# Patient Record
Sex: FEMALE | Race: WHITE | NOT HISPANIC OR LATINO | URBAN - NONMETROPOLITAN AREA
[De-identification: names, ages, dates, MRNs, and addresses within clinical notes are randomized per-mention and may not be internally consistent; named-entity substitution may affect disease eponyms.]

---
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
## 2025-01-27 ENCOUNTER — OFFICE VISIT (OUTPATIENT)
Dept: URBAN - NONMETROPOLITAN AREA SURGERY CENTER 1 | Facility: SURGERY CENTER | Age: 20
End: 2025-01-27
Payer: COMMERCIAL

## 2025-01-27 DIAGNOSIS — K21.00 CHRONIC REFLUX ESOPHAGITIS: ICD-10-CM

## 2025-01-27 DIAGNOSIS — K22.89 OTHER SPECIFIED DISEASE OF ESOPHAGUS: ICD-10-CM

## 2025-01-27 DIAGNOSIS — K21.00 ESOPHAGITIS, REFLUX: ICD-10-CM

## 2025-01-27 PROCEDURE — 43239 EGD BIOPSY SINGLE/MULTIPLE: CPT | Performed by: INTERNAL MEDICINE

## 2025-01-27 PROCEDURE — 00731 ANES UPR GI NDSC PX NOS: CPT | Performed by: NURSE ANESTHETIST, CERTIFIED REGISTERED

## 2025-01-27 RX ORDER — OMEPRAZOLE 40 MG/1
1 CAPSULE 30 MINUTES BEFORE MORNING MEAL CAPSULE, DELAYED RELEASE ORAL ONCE A DAY
Qty: 90 | Refills: 3 | Status: ACTIVE | COMMUNITY
Start: 2024-10-22

## 2025-01-27 RX ORDER — RIZATRIPTAN 10 MG/1
TABLET, FILM COATED ORAL
Qty: 9 TABLET | Status: ACTIVE | COMMUNITY

## 2025-01-27 RX ORDER — VALACYCLOVIR 1 G/1
TABLET, FILM COATED ORAL
Qty: 30 TABLET | Status: ACTIVE | COMMUNITY

## 2025-01-27 RX ORDER — DICYCLOMINE HYDROCHLORIDE 10 MG/1
1 CAPSULE CAPSULE ORAL THREE TIMES A DAY
Qty: 90 CAPSULE | Refills: 11 | Status: ACTIVE | COMMUNITY
Start: 2024-10-22 | End: 2025-10-17

## 2025-01-27 RX ORDER — FREMANEZUMAB-VFRM 225 MG/1.5ML
INJECTION SUBCUTANEOUS
Qty: 4.5 MILLILITER | Status: ACTIVE | COMMUNITY

## 2025-01-27 RX ORDER — ONDANSETRON 4 MG/1
TABLET, ORALLY DISINTEGRATING ORAL
Qty: 5 EACH | Status: ACTIVE | COMMUNITY

## 2025-01-27 RX ORDER — NORETHINDRONE ACETATE AND ETHINYL ESTRADIOL TABLETS AND FERROUS FUMARATE TABLETS 1MG-20(24)
TAKE ONE TABLET BY MOUTH ONCE DAILY KIT ORAL
Qty: 84 EACH | Refills: 1 | Status: ACTIVE | COMMUNITY

## 2025-01-27 RX ORDER — NORETHINDRONE ACETATE AND ETHINYL ESTRADIOL TABLETS AND FERROUS FUMARATE TABLETS 1MG-20(24)
KIT ORAL
Qty: 84 EACH | Status: ACTIVE | COMMUNITY